# Patient Record
(demographics unavailable — no encounter records)

---

## 2024-11-29 NOTE — COUNSELING
[Fall prevention counseling provided] : Fall prevention counseling provided [Adequate lighting] : Adequate lighting [No throw rugs] : No throw rugs [Use proper foot wear] : Use proper foot wear [Use recommended devices] : Use recommended devices [Other: ____] : [unfilled] [de-identified] : Bed Safety

## 2024-11-29 NOTE — ASSESSMENT
[FreeTextEntry1] : 54 yr old  Home Visit  Seizure disorder- Advised to increase Keppra Brother declined  ( Level Lower end of normal)  Bed safety / Chair Safety  HTN- Controlled, meds daily Low salt , occasional Home Checking  Profound Disability- Discussed in detail with Brother HHA need for Pt to go outside and get fresh air when possible , Pt now has Appropriate Wheelchair which was needed   Declines Flu vaccine/ Declines PCV Vaccine  F/U 3-4 months Labs ,   sooner if needed

## 2024-11-29 NOTE — REVIEW OF SYSTEMS
[Fatigue] : fatigue [Vision Problems] : vision problems [Incontinence] : incontinence [Negative] : Respiratory [Abdominal Pain] : no abdominal pain [Vomiting] : no vomiting [Skin Rash] : no skin rash [FreeTextEntry3] : blind [FreeTextEntry9] : non verbal [de-identified] : non verbal/ non ambulatory  [de-identified] : non verbal

## 2024-11-29 NOTE — HEALTH RISK ASSESSMENT
[No] : No [Other reason not done] : Other reason not done [de-identified] : None [de-identified] : None [de-identified] : None [de-identified] : Soft [de-identified] : Unable to obtain

## 2024-11-29 NOTE — PHYSICAL EXAM
[No Acute Distress] : no acute distress [No JVD] : no jugular venous distention [Normal] : normal rate, regular rhythm, normal S1 and S2 and no murmur heard [No Edema] : there was no peripheral edema [Soft] : abdomen soft [Non Tender] : non-tender [Normal Anterior Cervical Nodes] : no anterior cervical lymphadenopathy [No CVA Tenderness] : no CVA  tenderness [No Joint Swelling] : no joint swelling [No Rash] : no rash [de-identified] : grossly impaired  [de-identified] : non verbal, non ambulatory

## 2024-11-29 NOTE — HISTORY OF PRESENT ILLNESS
[FreeTextEntry1] : Home Visit/ Medically Complex ( Declines Flu vaccine)  [de-identified] : Greeted at door by SYL/ Kiana. Pt is is situated in Large Crib Bed resting with eyes closed. Brother Ivan reports Pt had a Seizure last night and he is usually very tired after this happens, thus HHA kept him in bed more than usual, was up in morning for breakfast/ meds.  Provider spoke with brother about slight increase in Keppra to help with prevention of seizures, Brother declined ,, does not want Patient to be too drowsy.

## 2025-02-19 NOTE — HISTORY OF PRESENT ILLNESS
[FreeTextEntry1] : Home Visit / M11Q  Form  [de-identified] : Greeted at door by Pt brother Ivan. Pt is situated in Reclining Mechanical Chair in lIving room area, Pt looks comfortable. Pt making sounds and singing , seems to have more energy today than previous visits. Pt constantly clenches his teeth and fists and moves his upper body alot. Pt is aware Provider is there . HHA/ Sonia went to Grocery Store. Provider needs to fill out M11Q for ongoing Home Care Services which is desperately needed for this Pt who is total Care dependent for all daily needs .

## 2025-02-19 NOTE — HEALTH RISK ASSESSMENT
[No] : No [No falls in past year] : Patient reported no falls in the past year [Other reason not done] : Other reason not done [de-identified] : none [de-identified] : none [de-identified] : none [de-identified] : soft  [de-identified] : non verbal

## 2025-02-19 NOTE — ASSESSMENT
[FreeTextEntry1] : 54 yr old Home Visit  Profound Intellectual Disability-  Safety/ Fall precautions   Seizure Disorder- Provider attempted to explain to Brother how having Seizure can be  uncomfortable and painful to Pt Brother refuses to increase keppra  F/U Neurology encouraged  HTN- Controlled, Losartan daily low salt diet  Adm Encounter- Form to be filled out and Faxed to Home Care Agency   Brother declines Pneumonia/ Flu / shingrix Vaccines   Will order labs 1-2 months

## 2025-02-19 NOTE — COUNSELING
[Fall prevention counseling provided] : Fall prevention counseling provided [Adequate lighting] : Adequate lighting [Use proper foot wear] : Use proper foot wear [Use recommended devices] : Use recommended devices [Other: ____] : [unfilled] [FreeTextEntry1] : Brother/ HHA [de-identified] : Home Safety/ Fall precautions

## 2025-02-19 NOTE — PHYSICAL EXAM
[No Acute Distress] : no acute distress [No JVD] : no jugular venous distention [Normal] : no respiratory distress, lungs were clear to auscultation bilaterally and no accessory muscle use [Normal Rate] : normal rate  [Regular Rhythm] : with a regular rhythm [No Edema] : there was no peripheral edema [Soft] : abdomen soft [Non Tender] : non-tender [No CVA Tenderness] : no CVA  tenderness [No Joint Swelling] : no joint swelling [No Rash] : no rash [de-identified] : Non ambulatory  [de-identified] : eyes closed  [de-identified] : Gross deficits [de-identified] : non verbal

## 2025-02-19 NOTE — REVIEW OF SYSTEMS
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

## 2025-07-09 NOTE — PHYSICAL EXAM
[No Acute Distress] : no acute distress [No JVD] : no jugular venous distention [Normal] : normal rate, regular rhythm, normal S1 and S2 and no murmur heard [No Edema] : there was no peripheral edema [Soft] : abdomen soft [Non Tender] : non-tender [Normal Anterior Cervical Nodes] : no anterior cervical lymphadenopathy [No CVA Tenderness] : no CVA  tenderness [No Joint Swelling] : no joint swelling [No Rash] : no rash [de-identified] : Sitting in Specialized Chair , non verbal, [place] ( Possibly totally deaf)  [de-identified] : Grossly impaired  [de-identified] : Unable to assess

## 2025-07-09 NOTE — ASSESSMENT
[FreeTextEntry1] : 55 yr old   Profound Disability- Discussed again how outside air would be good for Pt Other types of stimulation such as outside noises  Encouraged brother to make room on Patio which is directly off main room  Adm Encounter- Will fillout M11Q and Fax to Agency as requested  HTN- Controlled, Losartan 25mg po daily Low salt diet  Seizure Disorder- Continue Keppra daily Will check Level again  Encouraged increase in stimuli Will o rder updated Labs ( Home draw)   Will call with results

## 2025-07-09 NOTE — HISTORY OF PRESENT ILLNESS
[Family Member] : family member [Formal Caregiver] : formal caregiver [FreeTextEntry1] : F/U Home Visit [de-identified] : Greeted at door by brother Ivan. Pt situated in large room with HHA feeding him his lunch. Pt/ Brother/ HHA have no new complaints. Pt needs M11 Q Form filled out by me for ongoing services.

## 2025-07-09 NOTE — REVIEW OF SYSTEMS
[Vision Problems] : vision problems [Hearing Loss] : hearing loss [Incontinence] : incontinence [Fever] : no fever [Chills] : no chills [Chest Pain] : no chest pain [Shortness Of Breath] : no shortness of breath [Wheezing] : no wheezing [Cough] : no cough [Abdominal Pain] : no abdominal pain [Diarrhea] : no diarrhea [Vomiting] : no vomiting [Hematuria] : no hematuria [Joint Pain] : no joint pain [Back Pain] : no back pain [Joint Swelling] : no joint swelling [Skin Rash] : no skin rash [Suicidal] : not suicidal [Insomnia] : no insomnia [Easy Bruising] : no easy bruising [de-identified] : non ambulatory

## 2025-07-09 NOTE — COUNSELING
[Fall prevention counseling provided] : Fall prevention counseling provided [Adequate lighting] : Adequate lighting [No throw rugs] : No throw rugs [Use proper foot wear] : Use proper foot wear [Use recommended devices] : Use recommended devices [Inadequate social support] : Inadequate social support [Financial issues] : Financial issues [Other: ____] : [unfilled] [de-identified] : Fall/ safety Precautions

## 2025-07-09 NOTE — HEALTH RISK ASSESSMENT
[No] : No [No falls in past year] : Patient reported no falls in the past year [Other reason not done] : Other reason not done [de-identified] : none [de-identified] : None [de-identified] : None [de-identified] : Regular  [de-identified] : non verbal